# Patient Record
Sex: FEMALE | URBAN - METROPOLITAN AREA
[De-identification: names, ages, dates, MRNs, and addresses within clinical notes are randomized per-mention and may not be internally consistent; named-entity substitution may affect disease eponyms.]

---

## 2017-01-01 ENCOUNTER — NURSING HOME VISIT (OUTPATIENT)
Dept: GERIATRICS | Facility: CLINIC | Age: 82
End: 2017-01-01
Payer: MEDICARE

## 2017-01-01 ENCOUNTER — TRANSFERRED RECORDS (OUTPATIENT)
Dept: HEALTH INFORMATION MANAGEMENT | Facility: CLINIC | Age: 82
End: 2017-01-01

## 2017-01-01 VITALS
HEIGHT: 60 IN | DIASTOLIC BLOOD PRESSURE: 71 MMHG | BODY MASS INDEX: 27.88 KG/M2 | HEART RATE: 65 BPM | OXYGEN SATURATION: 95 % | WEIGHT: 142 LBS | RESPIRATION RATE: 16 BRPM | TEMPERATURE: 97.6 F | SYSTOLIC BLOOD PRESSURE: 131 MMHG

## 2017-01-01 VITALS
SYSTOLIC BLOOD PRESSURE: 112 MMHG | WEIGHT: 138.6 LBS | DIASTOLIC BLOOD PRESSURE: 62 MMHG | HEIGHT: 60 IN | TEMPERATURE: 98.2 F | HEART RATE: 71 BPM | BODY MASS INDEX: 27.21 KG/M2 | RESPIRATION RATE: 17 BRPM | OXYGEN SATURATION: 94 %

## 2017-01-01 VITALS
DIASTOLIC BLOOD PRESSURE: 65 MMHG | WEIGHT: 148.3 LBS | SYSTOLIC BLOOD PRESSURE: 121 MMHG | RESPIRATION RATE: 16 BRPM | BODY MASS INDEX: 29.12 KG/M2 | HEART RATE: 66 BPM | HEIGHT: 60 IN | TEMPERATURE: 97.5 F | OXYGEN SATURATION: 96 %

## 2017-01-01 VITALS
HEIGHT: 60 IN | HEART RATE: 73 BPM | SYSTOLIC BLOOD PRESSURE: 113 MMHG | RESPIRATION RATE: 16 BRPM | TEMPERATURE: 96.8 F | DIASTOLIC BLOOD PRESSURE: 67 MMHG | OXYGEN SATURATION: 95 % | BODY MASS INDEX: 29.41 KG/M2 | WEIGHT: 149.8 LBS

## 2017-01-01 VITALS
RESPIRATION RATE: 16 BRPM | OXYGEN SATURATION: 96 % | WEIGHT: 143.1 LBS | HEART RATE: 62 BPM | HEIGHT: 60 IN | TEMPERATURE: 97.6 F | BODY MASS INDEX: 28.09 KG/M2 | SYSTOLIC BLOOD PRESSURE: 120 MMHG | DIASTOLIC BLOOD PRESSURE: 63 MMHG

## 2017-01-01 DIAGNOSIS — N17.9 ACUTE RENAL FAILURE, UNSPECIFIED ACUTE RENAL FAILURE TYPE (H): Primary | ICD-10-CM

## 2017-01-01 DIAGNOSIS — M15.0 PRIMARY OSTEOARTHRITIS INVOLVING MULTIPLE JOINTS: ICD-10-CM

## 2017-01-01 DIAGNOSIS — D72.829 LEUKOCYTOSIS, UNSPECIFIED TYPE: ICD-10-CM

## 2017-01-01 DIAGNOSIS — G30.1 LATE ONSET ALZHEIMER'S DISEASE WITHOUT BEHAVIORAL DISTURBANCE (H): ICD-10-CM

## 2017-01-01 DIAGNOSIS — N18.30 CKD (CHRONIC KIDNEY DISEASE) STAGE 3, GFR 30-59 ML/MIN (H): ICD-10-CM

## 2017-01-01 DIAGNOSIS — F02.80 LATE ONSET ALZHEIMER'S DISEASE WITHOUT BEHAVIORAL DISTURBANCE (H): ICD-10-CM

## 2017-01-01 DIAGNOSIS — I10 BENIGN ESSENTIAL HYPERTENSION: Primary | ICD-10-CM

## 2017-01-01 DIAGNOSIS — N30.01 ACUTE CYSTITIS WITH HEMATURIA: ICD-10-CM

## 2017-01-01 DIAGNOSIS — R79.9 ELEVATED BUN: ICD-10-CM

## 2017-01-01 DIAGNOSIS — R63.5 WEIGHT GAIN: ICD-10-CM

## 2017-01-01 DIAGNOSIS — Z20.828 EXPOSURE TO THE FLU: ICD-10-CM

## 2017-01-01 DIAGNOSIS — E03.9 HYPOTHYROIDISM, UNSPECIFIED TYPE: Primary | ICD-10-CM

## 2017-01-01 DIAGNOSIS — F02.80 DEMENTIA DUE TO MEDICAL CONDITION WITHOUT BEHAVIORAL DISTURBANCE (H): ICD-10-CM

## 2017-01-01 DIAGNOSIS — R34 DECREASED URINE OUTPUT: ICD-10-CM

## 2017-01-01 DIAGNOSIS — E86.0 DEHYDRATION: ICD-10-CM

## 2017-01-01 DIAGNOSIS — I10 ESSENTIAL HYPERTENSION: ICD-10-CM

## 2017-01-01 DIAGNOSIS — R63.8 DECREASED ORAL INTAKE: ICD-10-CM

## 2017-01-01 DIAGNOSIS — R21 RASH: Primary | ICD-10-CM

## 2017-01-01 DIAGNOSIS — R09.02 HYPOXEMIA: ICD-10-CM

## 2017-01-01 LAB
ANION GAP SERPL CALCULATED.3IONS-SCNC: 12 MMOL/L (ref 0–15)
BUN SERPL-MCNC: 154 MG/DL (ref 7–24)
CALCIUM SERPL-MCNC: 9.8 MG/DL (ref 8.5–10.1)
CHLORIDE SERPLBLD-SCNC: 110 MMOL/L (ref 98–112)
CO2 SERPL-SCNC: 24 MMOL/L (ref 21–32)
CREAT SERPL-MCNC: 4.96 MG/DL (ref 0.55–1.02)
DIFFERENTIAL: ABNORMAL
ERYTHROCYTE [DISTWIDTH] IN BLOOD BY AUTOMATED COUNT: 16.8 % (ref 11.5–14.5)
GFR SERPL CREATININE-BSD FRML MDRD: 8 ML/MIN/1.73M2
GLUCOSE SERPL-MCNC: 180 MG/DL (ref 74–106)
HCT VFR BLD AUTO: 32.7 % (ref 36–48)
HEMOGLOBIN: 11 G/DL (ref 12–16)
MCH RBC QN AUTO: 30 PG (ref 27–33)
MCHC RBC AUTO-ENTMCNC: 34 G/DL (ref 33–36)
MCV RBC AUTO: 90 FL (ref 80–100)
PLATELET # BLD AUTO: 172 K/UL (ref 150–400)
POTASSIUM SERPL-SCNC: 4.9 MMOL/L (ref 3.5–5.1)
RBC # BLD AUTO: 3.62 M/UL (ref 4.2–5.4)
SODIUM SERPL-SCNC: 146 MMOL/L (ref 136–145)
TSH SERPL-ACNC: 68.7 MCU/ML (ref 0.36–3.74)
WBC # BLD AUTO: 52.9 K/UL (ref 4.3–10.8)

## 2017-01-01 PROCEDURE — 99310 SBSQ NF CARE HIGH MDM 45: CPT | Performed by: NURSE PRACTITIONER

## 2017-01-01 PROCEDURE — 99207 ZZC CDG-CORRECTLY CODED, REVIEWED AND AGREE: CPT | Performed by: INTERNAL MEDICINE

## 2017-01-01 PROCEDURE — 99207 ZZC CDG-CORRECTLY CODED, REVIEWED AND AGREE: CPT | Performed by: NURSE PRACTITIONER

## 2017-01-01 PROCEDURE — 99309 SBSQ NF CARE MODERATE MDM 30: CPT | Performed by: NURSE PRACTITIONER

## 2017-01-01 PROCEDURE — 99309 SBSQ NF CARE MODERATE MDM 30: CPT | Performed by: INTERNAL MEDICINE

## 2017-01-01 PROCEDURE — 99308 SBSQ NF CARE LOW MDM 20: CPT | Performed by: NURSE PRACTITIONER

## 2017-01-05 NOTE — PROGRESS NOTES
[facility]    Chief Complaint   Patient presents with     custodial Regulatory       HPI:    Yara Chaudhari is a 89 year old  (3/22/1923), who is being seen today for a federally mandated E/M visit at [facility]. Today's concerns are:    Hypothyroidism  Patient with known hypothyroidism. Noted weight gain earlier in 2016. TSH    67.50   11/2/2016, down from TSH    114.0   9/6/2016. Synthroid dose increased to 150 mcg PO daily and next re-check due 1/13/17.     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 She has improved PO intake with meal consumption of %. Body mass index is 28.96 kg/(m^2).  Monitor at this time. Significantly cognitively impaired. Relies on staff to anticipate needs and assist with ADLs.     Essential hypertension with goal blood pressure less than 140/90  Chronic issue with recent BP Range: 121-127/61-65 and Wt Range: 145.0-149.7 lbs (12/2 Wt: 148.6 lbs and Current Wt: 148.3 lbs).  Managed with dietary measures and no antihypertensives used currently. Renal function as noted below.    Chronic kidney disease stage 3  CR     1.21   9/6/2016 and CR     1.18   4/26/2016. Encouraging good fluid intake and avoiding nephrotoxic medications.       ALLERGIES: Review of patient's allergies indicates no known allergies.  PAST MEDICAL HISTORY:  has a past medical history of Hypertension.  PAST SURGICAL HISTORY:  has no past surgical history on file.  FAMILY HISTORY: family history is not on file.  SOCIAL HISTORY:      MEDICATIONS:  Current Outpatient Prescriptions   Medication Sig Dispense Refill     Dimethicone-Zinc Oxide 5-5 % CREA Externally apply topically daily as needed       LORAZEPAM PO Take 0.25 mg by mouth as needed for other (Prior to dental appointment)        levobunolol (BETAGAN) 0.5 % ophthalmic solution Place 1 drop into both eyes daily       Levothyroxine Sodium  (LEVOTHROID PO) Take 150 mcg by mouth daily        sennosides (SENOKOT) 8.6 MG tablet Take 2 tablets by mouth daily       Calcium Carb-Cholecalciferol (CALCIUM + D3) 600-200 MG-UNIT TABS Take 2 tablets by mouth daily        ACETAMINOPHEN PO Take 1,000 mg by mouth 3 times daily        Medications reviewed:  Medications reconciled to facility chart and changes were made to reflect current medications as identified as above med list. Below are the changes that were made:   Medications stopped since last EPIC medication reconciliation:   There are no discontinued medications.    Medications started since last [facility] medication reconciliation:  No orders of the defined types were placed in this encounter.    Case Management:  I have reviewed the care plan and MDS and do agree with the plan. Patient's desire to return to the community is not assessible due to cognitive impairment.  Information reviewed:  Medications, vital signs, orders, and nursing notes.    ROS:  Unobtainable secondary to cognitive impairment or aphasia.    Exam:  /65 mmHg  Pulse 66  Temp(Src) 97.5  F (36.4  C)  Resp 16  Ht 5' (1.524 m)  Wt 148 lb 4.8 oz (67.268 kg)  BMI 28.96 kg/m2  SpO2 96%  GENERAL APPEARANCE:  Sleepy, in no distress, not able to assess orientation.   EYES:  EOM, lids, pupils and irises normal, sclera clear and conjunctiva normal, no discharge or mattering on lids or lashes noted  ENT:  Mouth normal, moist mucous membranes, nose normal without drainage or crusting, external ears without lesions  RESP:  respiratory effort and palpation of chest normal, no chest wall tenderness, no respiratory distress, Lung sounds clear, patient is on room air  CV:  Palpation and auscultation of heart done, rate and rhythm controlled and regular, no murmur, no rub or gallop. Edema none bilateral lower extremities. VASCULAR: no open areas on extremities; feet warm and skin intact  ABDOMEN:  normal bowel sounds, soft, nontender, no grimacing  or guarding with palpation, no hepatosplenomegaly or other masses  M/S:   Gait and station wheelchair bound, Digits and nails normal, no tenderness or swelling of the joints  NEURO: no facial asymmetry, does not verbally respond today and is not able to follow directions  PSYCH:  insight and judgement, memory not able to assess - nonverbal; affect and mood flat    Lab/Diagnostic data:      Last Basic Metabolic Panel:  Recent Labs   Lab Test 09/06/16 04/26/16   NA  141   --    POTASSIUM  4.0   --    CHLORIDE  105   --    DAMIAN  9.5   --    BUN  17   --    CR  1.21*  1.18   GLC  87   --        TSH   Date Value Ref Range Status   11/02/2016 67.50* 3.74 mcU/mL Final   09/06/2016 114.0* 3.74 mcU/mL Final     ASSESSMENT/PLAN  Hypothyroidism  Continues with elevated TSH. Synthroid increased and re-check due 1/13/17.      Weight gain  Noted to be stable in the past month as reported above. Will monitor for now. Adjusted Synthroid as above.     Essential hypertension with goal blood pressure less than 140/90  By history. Diet managed. Continue VS per facility routine.     CKD (chronic kidney disease) stage 3, GFR 30-59 ml/min (GFR 42 on 9/6/16, GFR 43 on 4/26/16)  Chronic. Renal function stable - f/u every 6-12 months and PRN.     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 Requires LTC.    Orders:  1. Check TSH on 1/13/17 and staff to update provider if out of normal range.   2. DC PRN Bismuth and PRN Dulcolax - not used.      Total time spent with patient visit was 25 min including patient visit and review of past records.Greater than 50% of total time spent with counseling and coordinating care.    Electronically signed by:  JULY Treadwell CNP

## 2017-02-06 NOTE — PROGRESS NOTES
[facility]    Chief Complaint   Patient presents with     RECHECK       HPI:    Yara Chaudhari is a 89 year old  (3/22/1923), who is being seen today for an episodic care visit at [facility]. Today's concern is:    Rash  Exposure to the flu  Hypoxemia  Staff report that on 2/5 patient noted to have a raised red rash over much of her lower back. Unable to state whether pruritic due to dementia and cognitive impairment. Patient recently started on Tamiflu due to two confirmed cases of influenza on her unit. She did have a decrease in o2 sats yesterday and also one episode of emesis. On call provider asked that Tamiflu be stopped, ordered PRN oxygen.     Today rash appears lessened from area marked. No further emesis, no respiratory symptoms. WEIGHT: 1/2/17 148 LBS AND 2/3/17 149.8 LBS and BP: 102-137/50-73. One instance of  recorded in the past month.       ALLERGIES: Review of patient's allergies indicates no known allergies.  Past Medical, Surgical, Family and Social History [facility].    Current Outpatient Prescriptions   Medication Sig Dispense Refill     Dimethicone-Zinc Oxide 5-5 % CREA Externally apply topically daily as needed       LORAZEPAM PO Take 0.25 mg by mouth as needed for other (Prior to dental appointment)        levobunolol (BETAGAN) 0.5 % ophthalmic solution Place 1 drop into both eyes daily       Levothyroxine Sodium (LEVOTHROID PO) Take 150 mcg by mouth daily        sennosides (SENOKOT) 8.6 MG tablet Take 2 tablets by mouth daily       Calcium Carb-Cholecalciferol (CALCIUM + D3) 600-200 MG-UNIT TABS Take 2 tablets by mouth daily        ACETAMINOPHEN PO Take 1,000 mg by mouth 3 times daily        Medications reviewed:  Medications reconciled to facility chart and changes were made to reflect current medications as identified as above med list. Below are the changes that were made:   Medications stopped since last EPIC medication reconciliation:    There are no discontinued medications.    Medications started since last [facility] medication reconciliation:  No orders of the defined types were placed in this encounter.     REVIEW OF SYSTEMS:  Unobtainable secondary to cognitive impairment or aphasia.    Physical Exam:  /67 mmHg  Pulse 73  Temp(Src) 96.8  F (36  C)  Resp 16  Ht 5' (1.524 m)  Wt 149 lb 12.8 oz (67.949 kg)  BMI 29.26 kg/m2  SpO2 95%  GENERAL APPEARANCE:  in no distress, cooperative, non verbal at baseline  RESP:  respiratory effort and palpation of chest normal, no respiratory distress  CV:  no edema  SKIN:  raised red rash over lower back, no open areas  NEURO:   flat facial expression, no asymmetry    Recent Labs:      Last Basic Metabolic Panel:  Recent Labs   Lab Test 09/06/16 04/26/16   NA  141   --    POTASSIUM  4.0   --    CHLORIDE  105   --    DAMIAN  9.5   --    BUN  17   --    CR  1.21*  1.18   GLC  87   --          TSH   Date Value Ref Range Status   01/13/2017 68.70* 0.36 - 3.74 mcU/mL Final   11/02/2016 67.50* 3.74 mcU/mL Final       Assessment/Plan:     Rash  Exposure to the flu  Hypoxemia     Acute onset on 2/5, sats now normal room air and no further emesis. Rash improved. ?contact dermatitis vs reaction to Tamiflu    Orders:  Triamcinolone 0.1% cream to rash BID x 7 days. Staff to update provider if not effective.     Time 15 minutes    Electronically signed by  JULY Treadwell CNP

## 2017-03-07 NOTE — PROGRESS NOTES
[facility]  Nursing Home Regulatory Visit  March 7, 2017    Chief Complaint   Patient presents with     half-way Regulatory       HPI:    Yara Chaudhari is a 89 year old  (3/22/1923), who is being seen today for a federally mandated E/M visit at [facility]. Today's concerns are:    1) HTN -- By history. SBPs 120s overall. Not on any anti-HTN medications.   2) Alzheimer's Dementia Without Behavioral Disturbance -- Chronic and progressive. She is non-verbal. Wheelchair bound. Appears comfortable. Requires 24/7 nursing cares.   3) Generalized Primary OA -- managed with acetaminophen 1000 mg TID. Appears comfortable.     ALLERGIES: Review of patient's allergies indicates no known allergies.    PAST MEDICAL HISTORY:   Past Medical History   Diagnosis Date     Hypertension        PAST SURGICAL HISTORY:   No past surgical history on file.    FAMILY HISTORY:   No family history on file.    SOCIAL HISTORY:   Lives in a SNF    MEDICATIONS:  Current Outpatient Prescriptions   Medication Sig Dispense Refill     Dimethicone-Zinc Oxide 5-5 % CREA Externally apply topically daily as needed       LORAZEPAM PO Take 0.25 mg by mouth as needed for other (Prior to dental appointment)        levobunolol (BETAGAN) 0.5 % ophthalmic solution Place 1 drop into both eyes daily       Levothyroxine Sodium (LEVOTHROID PO) Take 150 mcg by mouth daily        sennosides (SENOKOT) 8.6 MG tablet Take 2 tablets by mouth daily       Calcium Carb-Cholecalciferol (CALCIUM + D3) 600-200 MG-UNIT TABS Take 2 tablets by mouth daily        ACETAMINOPHEN PO Take 1,000 mg by mouth 3 times daily          Medications reviewed:  Medications reconciled to facility chart and changes were made to reflect current medications as identified as above med list. Below are the changes that were made:   Medications stopped since last EPIC medication reconciliation:   There are no discontinued medications.  Medications started since  last EPIC medication reconciliation:  No orders of the defined types were placed in this encounter.    Case Management:  I have reviewed the care plan and MDS and do agree with the plan.   Information reviewed:  Medications, vital signs, orders, and nursing notes.    ROS:  Unable to be obtained due to cognitive impairment or aphasia.     PHYSICAL EXAM:  /71  Pulse 65  Temp 97.6  F (36.4  C)  Resp 16  Ht 5' (1.524 m)  Wt 142 lb (64.4 kg)  SpO2 95%  BMI 27.73 kg/m2  Gen: sitting in wheelchair, alert and in no acute distress  Resp: breathing non-labored  GI: abdomen soft, non-distended  Ext: trace bilateral dependent LE edema  Neuro: CX II-XII grossly in tact; ROM in all four extremities grossly in tact  Psych: memory, judgement and insight impaired    Lab/Diagnostic data:  Reviewed as per Epic    ASSESSMENT/PLAN    1) HTN  By history. SBPs 120s overall. Not on any anti-HTN medications.   -- follow clinically    2) Alzheimer's Dementia Without Behavioral Disturbance   Chronic and progressive. She is non-verbal. Wheelchair bound. Appears comfortable.  -- ongoing 24/7 nursing and supportive cares    3) Generalized Primary OA   Appears comfortable.   -- managed with acetaminophen 1000 mg TID    Yara Chaudhari is stable. No concerns per nursing. No changes to plan of care today.    Electronically signed by:  Mckenna Whiting MD

## 2017-05-05 NOTE — PROGRESS NOTES
[facility]    Chief Complaint   Patient presents with     FDC Regulatory       HPI:      Yara Chaudhari is a 89 year old  (3/22/1923), who is being seen today for a federally mandated E/M visit at [facility] . Today's concerns are:    Hypothyroidism, unspecified type  TSH 67.50 11/2/2016, down from 114.0 on 9/6/2016. At that time, Synthroid dose was increased to 150 mcg PO daily. Recheck TSH in January was 68.70. There was not an increase to Synthroid.    Primary osteoarthritis involving multiple joints  Chronic pain. On scheduled Tylenol.     Late onset Alzheimer's disease without behavioral disturbance  Requires 24/7 nursing cares. Wheelchair bound and non verbal. Weight stable.   Wt Readings from Last 4 Encounters:   05/05/17 143 lb 1.6 oz (64.9 kg)   03/05/17 142 lb (64.4 kg)   02/06/17 149 lb 12.8 oz (67.9 kg)   01/04/17 148 lb 4.8 oz (67.3 kg)     Essential hypertension  By history and is controlled with diet.   Blood Pressure: 104-125/59-66  Heart rate: 61-66  O2: 95-96    ALLERGIES: Review of patient's allergies indicates no known allergies.  PAST MEDICAL HISTORY:  has a past medical history of Hypertension.  PAST SURGICAL HISTORY:  has no past surgical history on file.  FAMILY HISTORY: family history is not on file.  SOCIAL HISTORY:      MEDICATIONS:  Current Outpatient Prescriptions   Medication Sig Dispense Refill     Dimethicone-Zinc Oxide 5-5 % CREA Externally apply topically daily as needed       LORAZEPAM PO Take 0.25 mg by mouth as needed for other (Prior to dental appointment)        levobunolol (BETAGAN) 0.5 % ophthalmic solution Place 1 drop into both eyes daily       Levothyroxine Sodium (LEVOTHROID PO) Take 150 mcg by mouth daily        sennosides (SENOKOT) 8.6 MG tablet Take 2 tablets by mouth daily       Calcium Carb-Cholecalciferol (CALCIUM + D3) 600-200 MG-UNIT TABS Take 2 tablets by mouth daily        ACETAMINOPHEN PO Take 1,000 mg by mouth 3 times daily         Medications reviewed:  Medications reconciled to facility chart and changes were made to reflect current medications as identified as above med list. Below are the changes that were made:   Medications stopped since last EPIC medication reconciliation:   There are no discontinued medications.    Medications started since last [facility] medication reconciliation:  No orders of the defined types were placed in this encounter.    Case Management:  I have reviewed the care plan and MDS and do agree with the plan. Patient's desire to return to the community is not assessible due to cognitive impairment.  Information reviewed:  Medications, vital signs, orders, and nursing notes.    ROS:  Unobtainable secondary to cognitive impairment or aphasia.    Exam:  Vitals: /63  Pulse 62  Temp 97.6  F (36.4  C)  Resp 16  Ht 5' (1.524 m)  Wt 143 lb 1.6 oz (64.9 kg)  SpO2 96%  BMI 27.95 kg/m2  BMI= Body mass index is 27.95 kg/(m^2).  GENERAL APPEARANCE:  Sleeping, in no distress in the wheelchair, not able to assess orientation.   EYES:  EOM, lids, pupils and irises normal, sclera clear and conjunctiva normal, no discharge or mattering on lids or lashes noted  ENT:  Mouth normal, moist mucous membranes, nose normal without drainage or crusting, external ears without lesions  RESP:  respiratory effort and palpation of chest normal, no chest wall tenderness, no respiratory distress, Lung sounds clear, patient is on room air  CV:  Palpation and auscultation of heart done, rate and rhythm controlled and regular, no murmur, no rub or gallop. Edema none.  VASCULAR: no open areas on extremities.  ABDOMEN:  normal bowel sounds, soft, nontender, no grimacing or guarding with palpation, no hepatosplenomegaly or other masses  M/S:   Gait and station wheelchair bound, no tenderness or swelling of the joints  NEURO: no facial asymmetry and is not able to follow directions  PSYCH:  insight and judgement, memory not able to assess -  nonverbal;     Lab/Diagnostic data:      TSH   Date Value Ref Range Status   01/13/2017 68.70 (A) 0.36 - 3.74 mcU/mL Final   11/02/2016 67.50 (A) 3.74 mcU/mL Final       ASSESSMENT/PLAN  (E03.9) Hypothyroidism, unspecified type  (primary encounter diagnosis)  Comment: chronic, uncontrolled. Synthroid not increased in January after the TSH of 68.70  Plan: obtain a TSH on 5/8 and increase synthroid if indicated.    (M15.0) Primary osteoarthritis involving multiple joints  Comment: chronic, pain controlled  Plan: continue with scheduled tylenol.    (G30.1,  F02.80) Late onset Alzheimer's disease without behavioral disturbance  Comment: chronic, progressive. Weight stable.  Plan: continue with 24/7 nursing care.    (I10) Essential hypertension  Comment: chronic, controlled with diet.   Plan: continue to monitor.     Electronically signed by:  JULY Sales CNP

## 2017-05-25 NOTE — PROGRESS NOTES
[facility]    Chief Complaint   Patient presents with     Nursing Home Acute       HPI:    Yara Chaudhari is a 89 year old  (3/22/1923), who is being seen today for an episodic care visit at [facility] .  HPI information obtained from: facility chart records, facility staff and [facility] chart review.Today's concern is:    Acute renal failure, unspecified acute renal failure type (H)  Leukocytosis, unspecified type  Acute cystitis with hematuria  Decreased urine output  Decreased oral intake  Elevated BUN  Notified by nursing staff that patient had increased respiratory rate into the low 30's and a low grade temperature of 99.5. She appeared comfortable upon examination and was not in distress. She is nonverbal at baseline, was not tracking or following commands which is also baseline. Per staff, she sits at the table for meals but has not eaten for the past several days, limited amount of oral fluids. She has had a three pound weight loss in one week. Vital signs were 112/62, HR 73, RR 36, 95% on room air, temp 99.5. There were inspiratory crackles to RLL, all other lobes clear to auscultation. I spoke with her nephew, Yfn who is her decision maker. He elected to have labs completed on 5/25.  Labs today showed a WBC count of 52.9 and revealed acute renal failure with a creatinine of 4.96 (basline creatinine of 1.1-1.2) and a BUN of 152. K normal at 4.9, . I notified Yfn and spoke about the likelihood of a UTI, as well as dehydration due to decreased oral intake. He is aware that a WBC of that level could possibly indicate a leukemia. Her goals of care are comfort related on the POLST, he wishes to keep her at the facility an proceed with Rocephin 1 g IM x 5 days, IV placement with one liter of fluid overnight with repeat BMP and CBC with diff on 5/26, as well as a peripheral smear.   --5/25:Yara has been taking in small amounts of fluid today. She has been up for meals but did  not eat. She has appeared comfortable without respiratory distress. She had a small amount of dark colored urine in her brief. UA revealed a UTI. Culture pending. Vitals today: 107/61, HR 65, RR 17, temp 98.1, 94% on room air.   Ranges  WEIGHT: 5/17/17 141.8 LBS AND 5/24/17 138.6 LBS  BP: /47-68  P: 63-73  O2: 94-95    CBC RESULTS:   Recent Labs   Lab Test 05/25/17   WBC  52.9*   RBC  3.62*   HGB  11.0*   HCT  32.7*   MCV  90   MCH  30   MCHC  34   RDW  16.8*   PLT  172       Last Basic Metabolic Panel:  Recent Labs   Lab Test 05/25/17 09/06/16   NA  146*  141   POTASSIUM  4.9  4.0   CHLORIDE  110  105   DAMIAN  9.8  9.5   CO2  24   --    BUN  154*  17   CR  4.96*  1.21*   GLC  180*  87       ALLERGIES: Review of patient's allergies indicates no known allergies.  Past Medical, Surgical, Family and Social History reviewed and updated in [facility].    Current Outpatient Prescriptions   Medication Sig Dispense Refill     Dimethicone-Zinc Oxide 5-5 % CREA Externally apply topically daily as needed       LORAZEPAM PO Take 0.25 mg by mouth as needed for other (Prior to dental appointment)        levobunolol (BETAGAN) 0.5 % ophthalmic solution Place 1 drop into both eyes daily       Levothyroxine Sodium (LEVOTHROID PO) Take 162.5 mcg by mouth daily        sennosides (SENOKOT) 8.6 MG tablet Take 2 tablets by mouth daily       Calcium Carb-Cholecalciferol (CALCIUM + D3) 600-200 MG-UNIT TABS Take 2 tablets by mouth daily        ACETAMINOPHEN PO Take 1,000 mg by mouth 3 times daily        Medications reviewed:  Medications reconciled to facility chart and changes were made to reflect current medications as identified as above med list. Below are the changes that were made:   Medications stopped since last EPIC medication reconciliation:   There are no discontinued medications.    Medications started since last [facility] medication reconciliation:  No orders of the defined types were placed in this encounter.    REVIEW OF  SYSTEMS:  Unobtainable secondary to cognitive impairment or aphasia.    Physical Exam:  /62  Pulse 71  Temp 98.2  F (36.8  C)  Resp 17  Ht 5' (1.524 m)  Wt 138 lb 9.6 oz (62.9 kg)  SpO2 94%  BMI 27.07 kg/m2  GENERAL APPEARANCE:  Sleeping,  no distress, not able to assess orientation. Arouses with persistent stimulation.  EYES:  EOM, lids, pupils and irises normal, sclera clear and conjunctiva normal, no discharge or mattering on lids or lashes noted  RESP: no respiratory distress, Lung sounds with inspiratory crackles to RLL, patient is on room air  CV:  Palpation and auscultation of heart done, rate and rhythm controlled and regular, no murmur, no rub or gallop. Edema none bilateral lower extremities.   VASCULAR: no open areas on extremities; feet warm and pale.  M/S:   Gait and station wheelchair bound  NEURO: no facial asymmetry, does not verbally respond and is not able to follow directions  PSYCH:  insight and judgement, memory not able to assess - nonverbal; affect flat.    Recent Labs:      TSH   Date Value Ref Range Status   01/13/2017 68.70 (A) 0.36 - 3.74 mcU/mL Final       Assessment/Plan:  (N17.9) Acute renal failure, unspecified acute renal failure type (H)  (primary encounter diagnosis)  (R34) Decreased urine output  (R63.8) Decreased oral intake  (R79.9) Elevated BUN  (E86.0) Dehydration  Comment: acute secondary to decreased oral intake and dehydration. Last BMP was in September 2016.  Plan: IVF: D51/2 NS 75 ml/hr for the first 500 ml, then 50 ml/hr for the remaining portion of the one liter of fluid. BMP on 5/26.     (D72.829) Leukocytosis, unspecified type  (N30.01) Acute cystitis with hematuria  Comment: acute. The high level of WBC concerning for a malignancy, there are no records of a CBC in [facility] for comparison.   Plan: IM rocpehin 1 g x 5 days. Repeat CBC with diff and peripheral smear on 5/26.      Total time spent with patient visit at the skilled nursing facility was 35 min  including patient visit, review of past records and phone call to patient contact. Greater than 50% of total time spent with counseling and coordinating care.    Electronically signed by  JULY Sales CNP      Addendum 5/26: patient remained in bed all day shift. She received one liter of fluid overnight and had one wet brief this morning.  /85, HR 52, RR20, temp 98.6 oral, and 96% on room air. She appeared comfortable without pain. Her lung sounds are clear without signs of fluid overload.   I spoke with Yfn and explained the IVF may be doing more harm as evidenced by the elevated blood pressure. After further review of her labs, it appears she may have started to decline in 9/2016 when her TSH spiked to 114 even though her vitals and weights have been stable since that time. The elevated WBC count is concerning for a malignancy which may have been present for quite sometime but there is not a past WBC to compare to as she has been a comfort care focus. Yfn wishes to keep the patient comfortable at the facility. Further lab work and IVF have been discontinued. Morphine and atropine gtts ordered. A  was called and will come see the patient. Yfn plans on visiting tomorrow.